# Patient Record
(demographics unavailable — no encounter records)

---

## 2025-03-13 NOTE — DISCUSSION/SUMMARY
[FreeTextEntry1] : REASON FOR CONSULT: Carla Caban is a 32-year-old female referred by Dr. Buffy Ramirez for cancer genetic counseling and risk assessment due to a family history of cancer. Ms. Caban was seen on 2025 at which time medical and family history was ascertained and a pedigree constructed.   RELEVANT MEDICAL HERTORY: Ms. Caban is a healthy individual with no reported history of cancer. She has a family history of cancer, see below.  OTHER MEDICAL AND SURGICAL HERTORY: -	Medical History: asthma -	Surgical History: wisdom teeth  OB/GYN HERTORY: H/W: 5'9", 220lbs Obstetrical History:  Age at Menarche: 11 Menopausal Status: Premenopausal  Age at First Live Birth: N/A Oral Contraceptive Use: current Paragard user Hormone Replacement Therapy: No  CANCER SCREENING HERTORY:   Breast:  -	Mammography: - d/t lump reportedly, wnl, repeat in 6 months -	Sonography: No -	MRI: No -	Biopsies: No GYN: -	Pelvic Examination: 25- annual, wnl Colon: -	Colonoscopy: No Skin:   -	FBSE: Yes -	Lesions biopsied/removed: No  SOCIAL HERTORY: -	Tobacco-product use: No -	Environmental exposures: No   FAMILY HERTORY: Maternal ancestry was reported as Frisian/Wallisian/Polish/Ashkenazi Quaker and paternal ancestry was reported as Maori. A detailed family history of cancer was ascertained, see below and scanned chart for pedigree.   Ms. Caban's mother has a history of breast cancer diagnosed when she was 56. She pursued genetic testing in 2018 using [facility] BreastNext panel (17 genes) which was negative for any genetic variants (report reviewed and confirmed).   According to Ms. Caban no one else in the family has had germline testing for cancer susceptibility. Consanguinity was denied.  	 RISK ASSESSMENT: Ms. Caban's maternal family history of breast cancer is suggestive of an inherited predisposition to pancreatic cancer and related cancers. However, her mother, who was the best individual to undergo genetic testing as she had a personal history of cancer, underwent comprehensive breast genetic testing panel using [facility] BreastNext test and no pathogenic mutations were identified on her test. Given her mother's negative results, it would be unlikely that Ms. Caban would have a mutation that was not identified on her mother's genetic test as she has no additional family history of cancer on her paternal side of the family and the clinical utility of this testing for Ms. Caban is likely low. The Genetic Information Non-discrimination Act (ANGELITO) was also reviewed. Given this, genetic testing was not recommended today. Ms. Caban was made aware if anything changes in her personal or family history, she should contact us to re-assess her risk and re-evaluate the role of genetic testing.   Given Ms. Caban's personal and current reported family history of cancer, and the negative genetic test results in the family, the following screening guidelines and risk-reducing recommendations were discussed:  BREAST:  -	Given her family history of breast cancer, an JANET risk evaluation, v8 was conducted utilizing family history and reproductive factors.  An updated 33.56% remaining lifetime risk of breast cancer was quoted. As per National Comprehensive Cancer Network (NCCN) guidelines, women with a remaining lifetime breast cancer risk >20% as calculated by models such as JANET may consider increased-risk breast screening. This includes annual mammograms and annual breast MRIs starting at age 40.  -	We recommended patient consult with a breast specialist closer to the age of 40 to have an in depth discussion regarding appropriate breast management.   OTHER: -	In the absence of other indications, Ms. Caban should practice age-appropriate cancer screening of other organ systems as recommended for the general population.  PLAN:  1.	Genetic testing was not recommended today given her mother has already undergone comprehensive genetic testing. We recommended she contact us if anything changes in her personal or family history of cancer to reevaluate the role of genetic testing. 2.	Patient informed consult note(s) will be available through their [facility] patient portal. 3.	Ms. Caban was encouraged to contact us every 2-3 years to discuss relevant advances in cancer genetics, or sooner if there are any changes in her personal or family history of cancer.   For any additional questions please call Cancer Genetics at [phone].    Leidy Marte MS, [facility] Genetic Counselor, Cancer Genetics